# Patient Record
Sex: MALE | Race: OTHER | URBAN - METROPOLITAN AREA
[De-identification: names, ages, dates, MRNs, and addresses within clinical notes are randomized per-mention and may not be internally consistent; named-entity substitution may affect disease eponyms.]

---

## 2017-10-26 ENCOUNTER — OFFICE VISIT (OUTPATIENT)
Dept: INTERNAL MEDICINE CLINIC | Facility: CLINIC | Age: 32
End: 2017-10-26

## 2017-10-26 VITALS
HEART RATE: 66 BPM | TEMPERATURE: 98 F | SYSTOLIC BLOOD PRESSURE: 122 MMHG | WEIGHT: 189 LBS | DIASTOLIC BLOOD PRESSURE: 80 MMHG | HEIGHT: 66 IN | BODY MASS INDEX: 30.37 KG/M2 | OXYGEN SATURATION: 98 %

## 2017-10-26 DIAGNOSIS — J06.9 UPPER RESPIRATORY TRACT INFECTION, UNSPECIFIED TYPE: Primary | ICD-10-CM

## 2017-10-26 PROCEDURE — 99213 OFFICE O/P EST LOW 20 MIN: CPT | Performed by: INTERNAL MEDICINE

## 2017-10-26 PROCEDURE — 99212 OFFICE O/P EST SF 10 MIN: CPT | Performed by: INTERNAL MEDICINE

## 2017-10-26 RX ORDER — IBUPROFEN 200 MG
200 TABLET ORAL EVERY 6 HOURS PRN
COMMUNITY

## 2017-10-26 RX ORDER — AMOXICILLIN AND CLAVULANATE POTASSIUM 875; 125 MG/1; MG/1
1 TABLET, FILM COATED ORAL 2 TIMES DAILY
Qty: 20 TABLET | Refills: 0 | Status: SHIPPED | OUTPATIENT
Start: 2017-10-26 | End: 2017-12-21 | Stop reason: ALTCHOICE

## 2017-10-26 NOTE — PROGRESS NOTES
Patient ID: Eleanor Donahue is a 28year old male.   Patient presents with:  Ear Pain: x4 days  Cough: Reports SOB after coughing       HISTORY OF PRESENT ILLNESS:   HPI  4 day(s) ago  Fever - Yes, Tmax (Unknown, subjective)  Cough - Yes, productive - Y Cans of beer per week         Comment: 10 - 20 drinks every weekend     Drug use: No    Sexual activity: Not on file     Other Topics Concern   None on file     Social History Narrative   None on file           PHYSICAL EXAM:      10/26/17  1132   BP: 122/

## 2017-10-26 NOTE — PATIENT INSTRUCTIONS
1.  Take antibiotics as prescribed. 2.  Symptomatic treatment with hot showers, steams, plenty of fluids, cough suppressants.     Preventing Common Respiratory Infections  Respiratory infections such as colds and influenza (“the flu”) are common in [facility] · Severe dehydration (loss of fluids)  · Sinus problems  · Ear infections   Get a flu vaccine  A flu vaccine protects you from influenza (but not other colds or infections). Get a vaccine each fall, before flu season starts.  This can be done at a clinic, h

## 2017-12-21 ENCOUNTER — OFFICE VISIT (OUTPATIENT)
Dept: INTERNAL MEDICINE CLINIC | Facility: CLINIC | Age: 32
End: 2017-12-21

## 2017-12-21 VITALS
WEIGHT: 196.81 LBS | HEART RATE: 99 BPM | BODY MASS INDEX: 32 KG/M2 | TEMPERATURE: 101 F | DIASTOLIC BLOOD PRESSURE: 79 MMHG | SYSTOLIC BLOOD PRESSURE: 140 MMHG

## 2017-12-21 DIAGNOSIS — R50.9 ACUTE FEBRILE ILLNESS: Primary | ICD-10-CM

## 2017-12-21 PROCEDURE — 99214 OFFICE O/P EST MOD 30 MIN: CPT | Performed by: INTERNAL MEDICINE

## 2017-12-21 PROCEDURE — 99212 OFFICE O/P EST SF 10 MIN: CPT | Performed by: INTERNAL MEDICINE

## 2017-12-21 RX ORDER — OSELTAMIVIR PHOSPHATE 75 MG/1
75 CAPSULE ORAL 2 TIMES DAILY
Qty: 10 CAPSULE | Refills: 0 | Status: SHIPPED | OUTPATIENT
Start: 2017-12-21 | End: 2017-12-26

## 2017-12-21 NOTE — PROGRESS NOTES
HPI:    Patient ID: Amber Diggs. is a 28year old male. Pt is masked  Cough   This is a new problem. Episode onset: 12/18/2017. The problem has been gradually worsening.  Associated symptoms include ear pain (mild), a fever (Ran as high as 102 at [facility] THYROID CA      Smoking status: Former Smoker                                                              Packs/day: 0.10      Years: 10.00        Types: Cigarettes  Smokeless tobacco: Never Used                      Alcohol use: Yes           1.0 oz/week cold symptoms that began 12/18/2017. Pt has a known history of strep sore throat.   - On exam, patient unremarkable  -Temperature on arrival = 101.1 degrees F  -Ordered Respiratory Panel FLU A + B, RSV  -Prescribed Oseltamivir Phosphate (TAMIFLU) 75 MG  -I

## 2017-12-22 ENCOUNTER — TELEPHONE (OUTPATIENT)
Dept: INTERNAL MEDICINE CLINIC | Facility: CLINIC | Age: 32
End: 2017-12-22

## 2017-12-22 ENCOUNTER — TELEPHONE (OUTPATIENT)
Dept: OTHER | Age: 32
End: 2017-12-22

## 2017-12-22 RX ORDER — BENZONATATE 200 MG/1
200 CAPSULE ORAL 3 TIMES DAILY PRN
Qty: 12 CAPSULE | Refills: 0 | Status: SHIPPED | OUTPATIENT
Start: 2017-12-22 | End: 2018-12-21

## 2017-12-22 NOTE — TELEPHONE ENCOUNTER
Signed Prescriptions Disp Refills    benzonatate 200 MG Oral Cap 12 capsule 0      Sig: Take 1 capsule (200 mg total) by mouth 3 (three) times daily as needed for cough.         Authorizing Provider: Tavo kebede to his pharmacy

## 2017-12-22 NOTE — TELEPHONE ENCOUNTER
Alma Delia at [address] [place] [address] Lab calling in a Positive Flue on this patient. Message fwd to  [facility].

## 2017-12-22 NOTE — TELEPHONE ENCOUNTER
Pt called, saw Dr. Fredrick Montague yesterday and was told he had the flu. He  was up all night coughing. He is requesting cough medicine. Please advise.

## 2018-12-21 ENCOUNTER — OFFICE VISIT (OUTPATIENT)
Dept: INTERNAL MEDICINE CLINIC | Facility: CLINIC | Age: 33
End: 2018-12-21
Payer: COMMERCIAL

## 2018-12-21 VITALS
HEIGHT: 66 IN | SYSTOLIC BLOOD PRESSURE: 137 MMHG | HEART RATE: 71 BPM | BODY MASS INDEX: 31.5 KG/M2 | DIASTOLIC BLOOD PRESSURE: 82 MMHG | WEIGHT: 196 LBS | TEMPERATURE: 98 F

## 2018-12-21 DIAGNOSIS — Z71.3 DIETARY COUNSELING: ICD-10-CM

## 2018-12-21 DIAGNOSIS — Z11.3 SCREEN FOR STD (SEXUALLY TRANSMITTED DISEASE): ICD-10-CM

## 2018-12-21 DIAGNOSIS — R03.0 PRE-HYPERTENSION: ICD-10-CM

## 2018-12-21 DIAGNOSIS — Z00.00 ANNUAL PHYSICAL EXAM: Primary | ICD-10-CM

## 2018-12-21 PROCEDURE — 99212 OFFICE O/P EST SF 10 MIN: CPT | Performed by: INTERNAL MEDICINE

## 2018-12-21 PROCEDURE — 99213 OFFICE O/P EST LOW 20 MIN: CPT | Performed by: INTERNAL MEDICINE

## 2018-12-21 NOTE — PATIENT INSTRUCTIONS
Prevention Guidelines, Men Ages 25 to 44  Screening tests and vaccines are an important part of managing your health. A screening test is done to find possible disorders or diseases in people who don't have any symptoms.  The goal is to find a disease ear Vaccines Who needs it How often   Chickenpox (varicella) All men in this age group who have no record of this infection or vaccine 2 doses; the second dose should be given at least 4 weeks after the first dose   Hepatitis A Men at increased risk for infect Sexually transmitted infection prevention Men who are sexually active At routine exams   Skin cancer Prevention of skin cancer in fair-skinned adults through age 25 At routine exams   1Those who are 25years of age, who are not up-to-date on their childhoo Too much weight can make snoring worse. Extra weight puts pressure on your neck tissues and lungs, making breathing harder.  If you're overweight, ask your healthcare provider about a weight-loss program.  Exercise regularly  Exercise can help you lose weig Changing the way you eat can improve your health. It can lower your cholesterol and blood pressure, and help you stay at a healthy weight. Your diet doesn’t have to be bland and boring to be healthy.  Just watch your calories and follow these steps:    Step · Drink more water to match your fiber increase to help prevent constipation. Date Last Reviewed: 6/1/2017  © 3544-3980 [employer]. 140[address] [facility] [place], [address] [place] [address]. All rights reserved.  This information is not intended as a substi Fat is an energy source that can be stored until needed. Fat does not raise blood sugar. However, it can raise blood cholesterol, increasing the risk of heart disease. Fat is also high in calories, which can cause weight gain.  Not all types of fat are the Date Last Reviewed: 3/1/2016  © 2358-8911 [employer]. 1407 [facility] [place], [address] [place]. All rights reserved. This information is not intended as a substitute for professional medical care.  Always follow your healthcare professional' Date Last Reviewed: 6/1/2017  © 5985-1034 [employer]. 1407 [facility] [place], [address] [place]. All rights reserved. This information is not intended as a substitute for professional medical care.  Always follow your healthcare professional'

## 2018-12-21 NOTE — PROGRESS NOTES
HPI:    Patient ID: Giselle Fabian. is a 35year old male. Physical (annual px)      HPI  32y old gentleman, no significant PMH, who presents for annual physical.   Doing well, has complaints of difficulty falling asleep.  Usually sleeps around 9pm, ana Review of Systems:     Constitutional: Negative for activity change, appetite change, chills, fatigue and unexpected weight change. HENT: Negative for congestion, ear discharge, ear pain, sinus pressure, sore throat and tinnitus.     Eyes: Negative for ph Neck: Normal range of motion. Neck supple. Cardiovascular: Normal rate, regular rhythm, normal heart sounds and intact distal pulses. Edema not present. Pulmonary/Chest: Effort normal. No respiratory distress. He has no wheezes. He has no rales.  He Discussed with patient the need to perform moderate to vigorous physical activity of at least 30-45 minutes a day, as tolerated, 3 to 4 times per week.  This should include cardiovascular exercise like running/elliptical as well as some resistance or weight Standing Expiration Date: 12/21/2019      Lipase [E]          Standing Status: Future          Standing Expiration Date: 12/21/2019      HCV Antibody          Standing Status: Future          Standing Expiration Date: 12/21/2019      Hepatitis B Vuong Diabetes mellitus, type 2  All men with prediabetes Every year   Hepatitis C If at increased risk At routine exams   High cholesterol or triglycerides All men ages 28 and older, and younger men at high risk for coronary artery disease At least every 5 year Pneumococca (PCV13) and Pneumococcal (PPSV23) Men at increased risk for infection – talk with your healthcare provider PCV13: 1 dose ages 23 to 72 (protects against 13 types of pneumococcal bacteria)  PPSV23: 1 to 2 doses through age 59, or 1 dose at 72 or Sleeping on your side may keep throat tissue from blocking your air passage. This may improve or even cure snoring. But it can be hard to stop sleeping on your back. Try sewing a pocket or sock onto the back of a T-shirt or pajama top.  Put a few tennis bal If something blocks your nose, treating the problem may help improve snoring. Your healthcare provider can suggest medicines for allergies or sinus problems. Nasal saline rinses can also open up the nasal passages.  Nasal strips applied on the bridge of the · Limit high-salt ingredients, such as soy sauce, bouillon, and garlic salt. · Instead of adding salt when cooking, season your food with herbs and flavorings. Try lemon, garlic, and onion, or salt-free herb seasonings.   · Limit convenience foods, such as Food is a source of fuel and nourishment for your body. It’s also a source of pleasure. Having diabetes doesn’t mean you have to eat special foods or give up desserts. Instead, your dietitian can show you how to plan meals to suit your body.  To start, lear · Polyunsaturated fats are mostly found in vegetable oils, such as corn, safflower, and soybean oils. They are also found in some seeds, nuts, and fish. Polyunsaturated fats lower LDL (unhealthy) cholesterol.  So, choosing them instead of saturated fats is · Get fruity. Stewing apples with just a little bit of sugar and cinnamon can make an excellent side dish for almost any meal.  · Get stocked. Freeze soup and stock to use later. · Get fresh.  Make it a goal to taste at least one new fruit or vegetable a m

## 2020-02-25 ENCOUNTER — OFFICE VISIT (OUTPATIENT)
Dept: INTERNAL MEDICINE CLINIC | Facility: CLINIC | Age: 35
End: 2020-02-25
Payer: COMMERCIAL

## 2020-02-25 VITALS
HEIGHT: 66 IN | SYSTOLIC BLOOD PRESSURE: 128 MMHG | BODY MASS INDEX: 31.02 KG/M2 | TEMPERATURE: 99 F | HEART RATE: 99 BPM | WEIGHT: 193 LBS | DIASTOLIC BLOOD PRESSURE: 79 MMHG

## 2020-02-25 DIAGNOSIS — R68.89 FLU-LIKE SYMPTOMS: Primary | ICD-10-CM

## 2020-02-25 DIAGNOSIS — Z20.828 EXPOSURE TO THE FLU: ICD-10-CM

## 2020-02-25 PROCEDURE — 99214 OFFICE O/P EST MOD 30 MIN: CPT | Performed by: INTERNAL MEDICINE

## 2020-02-25 RX ORDER — CODEINE PHOSPHATE AND GUAIFENESIN 10; 100 MG/5ML; MG/5ML
5 SOLUTION ORAL EVERY 6 HOURS PRN
Qty: 240 ML | Refills: 0 | Status: SHIPPED | OUTPATIENT
Start: 2020-02-25

## 2020-02-25 RX ORDER — OSELTAMIVIR PHOSPHATE 75 MG/1
75 CAPSULE ORAL 2 TIMES DAILY
Qty: 10 CAPSULE | Refills: 0 | Status: SHIPPED | OUTPATIENT
Start: 2020-02-25 | End: 2020-02-25

## 2020-02-25 RX ORDER — OSELTAMIVIR PHOSPHATE 75 MG/1
75 CAPSULE ORAL 2 TIMES DAILY
Qty: 10 CAPSULE | Refills: 0 | Status: SHIPPED | OUTPATIENT
Start: 2020-02-25 | End: 2020-03-01

## 2020-02-25 NOTE — PROGRESS NOTES
History of Present Illness   Patient ID: Fuentes Nicolas. is a 29year old male. Chief Complaint: Body ache and/or chills (c/o coughing, sneezing, dry throat. )      Flu   This is a new problem. The current episode started yesterday.  The problem occurs Eyes: Conjunctivae, EOM and lids are normal. Right eye exhibits no discharge. Left eye exhibits no discharge. Neck: Neck supple. Cardiovascular: Normal rate and normal heart sounds.     Pulmonary/Chest: Effort normal and breath sounds normal. No respira Reviewed Active Problems: There are no active problems to display for this patient.      Reviewed Past Medical History:  Past Medical History:   Diagnosis Date   • History of strep sore throat       Reviewed Family History:  Family History   Problem Relati - guaiFENesin-codeine (CHERATUSSIN AC) 100-10 MG/5ML Oral Solution; Take 5 mL by mouth every 6 (six) hours as needed. Dispense: 240 mL; Refill: 0    2. Exposure to the flu  - Oseltamivir Phosphate 75 MG Oral Cap;  Take 1 capsule (75 mg total) by mouth 2 (t · Acetaminophen or ibuprofen will help ease your fever, muscle aches, and headache. Don’t give aspirin to anyone younger than 25 who has the flu. Aspirin can harm the liver. · Nausea and loss of appetite are common with the flu. Eat light meals.  Drink 6 t © 5545-2054 [employer]. 1407 [facility] [place], [facility] [place]. All rights reserved. This information is not intended as a substitute for professional medical care. Always follow your healthcare professional's instructions.

## 2024-03-25 ENCOUNTER — HOSPITAL ENCOUNTER (OUTPATIENT)
Age: 39
Discharge: HOME | End: 2024-03-25
Payer: COMMERCIAL

## 2024-03-25 ENCOUNTER — APPOINTMENT (OUTPATIENT)
Dept: GENERAL RADIOLOGY | Age: 39
End: 2024-03-25
Attending: PHYSICIAN ASSISTANT
Payer: COMMERCIAL

## 2024-03-25 VITALS
DIASTOLIC BLOOD PRESSURE: 76 MMHG | HEART RATE: 60 BPM | RESPIRATION RATE: 18 BRPM | SYSTOLIC BLOOD PRESSURE: 112 MMHG | TEMPERATURE: 98 F | OXYGEN SATURATION: 99 %

## 2024-03-25 DIAGNOSIS — S92.352A CLOSED FRACTURE OF BASE OF FIFTH METATARSAL BONE OF LEFT FOOT, INITIAL ENCOUNTER: Primary | ICD-10-CM

## 2024-03-25 PROCEDURE — 99214 OFFICE O/P EST MOD 30 MIN: CPT

## 2024-03-25 PROCEDURE — 99204 OFFICE O/P NEW MOD 45 MIN: CPT

## 2024-03-25 PROCEDURE — 73630 X-RAY EXAM OF FOOT: CPT | Performed by: PHYSICIAN ASSISTANT

## 2024-03-25 PROCEDURE — 28470 CLTX METATARSAL FX WO MNP EA: CPT

## 2024-03-25 RX ORDER — IBUPROFEN 600 MG/1
600 TABLET ORAL ONCE
Status: COMPLETED | OUTPATIENT
Start: 2024-03-25 | End: 2024-03-25

## 2024-03-25 NOTE — ED INITIAL ASSESSMENT (HPI)
Pt presents with pain and swelling to left foot. States she slipped and fell last night at home.

## 2024-03-25 NOTE — DISCHARGE INSTRUCTIONS
FOLLOW UP WITH PODIATRY REFERRAL; FOLLOW CAST INSTRUCTIONS AND CONTINUE IBUPROFEN as GUIDED. USE CRUTCHES UNTIL PODIATRY CLEARANCE.

## 2024-03-25 NOTE — ED PROVIDER NOTES
Chief Complaint   Patient presents with    Leg or Foot Injury       HPI:     Chuy Gao Jr. is a 38 year old male who presents for evaluation of left foot injury overnight.  States was walking downstairs and suddenly stepping on a children's toy rolling the left foot.  Notes pain and swelling localized laterally.  Denies previous foot fracture.  Pain aggravated with attempted weightbearing.  Denies numbness or tingling, took Tylenol early a.m. with mild improvement, pain is a 4 out of 10, requesting ibuprofen on arrival.  Denies associated injury including head injury knee or hip pain.      PFSH    PFSH asessment screens reviewed and agree.  Nurses notes reviewed I agree with documentation.    Family History   Problem Relation Age of Onset    Diabetes Father     Hypertension Father     Cancer Maternal Grandmother         BREAST CA    Hypertension Paternal Grandfather     Diabetes Paternal Grandfather     Other (Other[other]) Paternal Grandfather     Cancer Paternal Uncle         ADRENAL GLAND CA    Cancer Cousin         SPINAL CA    Cancer Paternal Aunt         ADRENAL GLAND CA    Cancer Maternal Aunt         THYROID CA     Family history reviewed with patient/caregiver and is not pertinent to presenting problem.  Social History     Socioeconomic History    Marital status:      Spouse name: Not on file    Number of children: Not on file    Years of education: Not on file    Highest education level: Not on file   Occupational History    Not on file   Tobacco Use    Smoking status: Former     Packs/day: 0.10     Years: 10.00     Additional pack years: 0.00     Total pack years: 1.00     Types: Cigarettes     Quit date: 2016     Years since quittin.2    Smokeless tobacco: Never    Tobacco comments:     smokes cigars   Vaping Use    Vaping Use: Never used   Substance and Sexual Activity    Alcohol use: Yes     Alcohol/week: 1.7 standard drinks of alcohol     Types: 2 Cans of beer per week      Comment: 10 - 20 drinks every weekend/ social    Drug use: No    Sexual activity: Not on file   Other Topics Concern    Not on file   Social History Narrative    Not on file     Social Determinants of Health     Financial Resource Strain: Not on file   Food Insecurity: Not on file   Transportation Needs: Not on file   Physical Activity: Not on file   Stress: Not on file   Social Connections: Not on file   Housing Stability: Not on file         ROS:   Positive for stated complaint: Foot pain.  All other systems reviewed and negative except as noted above.  Constitutional and Vital Signs Reviewed.      Physical Exam:     Findings:    /76   Pulse 60   Temp 97.8 °F (36.6 °C) (Temporal)   Resp 18   SpO2 99%   GENERAL: well developed, well nourished, well hydrated, no distress  SKIN: good skin turgor, no obvious rashes  EXTREMITIES: Moderate edema and localized tenderness along the dorsal lateral aspect of the left foot in proximity to the fifth metatarsal.  No crepitus.  No skin tear.  No ankle tenderness.  Normal Silva test.  Normal active range of motion of knee ankle foot.  BUCHANAN without difficulty  HEAD: normocephalic, atraumatic  EYES: sclera non icteric bilateral, conjunctiva clear  NOSE: nasal turbinates: pink, normal mucosa  NEURO: No focal deficits  PSYCH: Alert and oriented x3.  Answering questions appropriately.  Mood appropriate.    MDM/Assessment/Plan:   Orders for this encounter:    Orders Placed This Encounter    XR FOOT, COMPLETE (MIN 3 VIEWS), LEFT (CPT=73630)     Order Specific Question:   What is the Relevant Clinical Indication / Reason for Exam?     Answer:   left foot pain     Order Specific Question:   Release to patient     Answer:   Immediate    Short leg splint    Crutches    ibuprofen (Motrin) tab 600 mg       Labs performed this visit:  No results found for this or any previous visit (from the past 10 hour(s)).    MDM:  Shows a fracture comminuted along the base of the left fifth  metatarsal.  No malalignment.  Patient agrees with short leg splint applied, neurovascular intact.  Ambulated well with crutches nonweightbearing pending D.P.M. referral and follow-up within the week ahead.  Agrees with over-the-counter NSAIDs with precaution with continued use over the days ahead.  Happy with plan of care.  Instructed on indications to go to the emergency department.    Diagnosis:    ICD-10-CM    1. Closed fracture of base of fifth metatarsal bone of left foot, initial encounter  S92.352A           All results reviewed and discussed with patient.  See AVS for detailed discharge instructions for your condition today.    Follow Up with:  Antoine Bain DPM  [facility] [address]  [place] [id number] [phone]    Schedule an appointment as soon as possible for a visit in 1 week  PODIATRY REFERRAL. CALL FOR APPOINTMENT.

## 2024-03-29 ENCOUNTER — OFFICE VISIT (OUTPATIENT)
Dept: PODIATRY CLINIC | Facility: CLINIC | Age: 39
End: 2024-03-29

## 2024-03-29 DIAGNOSIS — S92.352A CLOSED FRACTURE OF BASE OF FIFTH METATARSAL BONE, LEFT, INITIAL ENCOUNTER: Primary | ICD-10-CM

## 2024-03-29 PROCEDURE — 99204 OFFICE O/P NEW MOD 45 MIN: CPT | Performed by: STUDENT IN AN ORGANIZED HEALTH CARE EDUCATION/TRAINING PROGRAM

## 2024-03-29 NOTE — PROGRESS NOTES
[facility] Podiatry  Progress Note      Chuy Gao Jr. is a 38 year old male.   Chief Complaint   Patient presents with    Fracture     Consult - L foot fx  - Pt tripped on a dog toy and fell down the stairs 03/24 - Tiesha t to  on 3/25, was put in a splint. Pain when bending foot.  No numbness or tingling.             HPI:     Patient is a pleasant 38-year-old male who presents to clinic today for evaluation of left fifth metatarsal base fracture which she sustained on March 24 as he tripped over a toy and fell down the stairs.  Patient went to an urgent care center and was placed in a posterior mold and given crutches to be nonweightbearing to left lower extremity.  Patient works as a  and also does refereeing for professional boxing.  He has been taking ibuprofen for pain as needed    Allergies: Patient has no known allergies.    Current Outpatient Medications   Medication Sig Dispense Refill    guaiFENesin-codeine (CHERATUSSIN AC) 100-10 MG/5ML Oral Solution Take 5 mL by mouth every 6 (six) hours as needed. 240 mL 0    ibuprofen 200 MG Oral Tab Take 1 tablet (200 mg total) by mouth every 6 (six) hours as needed for Pain.      acetaminophen (TYLENOL EXTRA STRENGTH) 500 MG Oral Tab Take 1 tablet (500 mg total) by mouth every 6 (six) hours as needed for Pain.        Past Medical History:   Diagnosis Date    History of strep sore throat       Past Surgical History:   Procedure Laterality Date    OTHER SURGICAL HISTORY      REMOVAL OF CYST - CHEST      Family History   Problem Relation Age of Onset    Diabetes Father     Hypertension Father     Cancer Maternal Grandmother         BREAST CA    Hypertension Paternal Grandfather     Diabetes Paternal Grandfather     Other (Other[other]) Paternal Grandfather     Cancer Paternal Uncle         ADRENAL GLAND CA    Cancer Cousin         SPINAL CA    Cancer Paternal Aunt         ADRENAL GLAND CA    Cancer Maternal Aunt         THYROID CA      Social  History     Socioeconomic History    Marital status:    Tobacco Use    Smoking status: Former     Packs/day: 0.10     Years: 10.00     Additional pack years: 0.00     Total pack years: 1.00     Types: Cigarettes     Quit date: 2016     Years since quittin.2    Smokeless tobacco: Never    Tobacco comments:     smokes cigars   Vaping Use    Vaping Use: Never used   Substance and Sexual Activity    Alcohol use: Yes     Alcohol/week: 1.7 standard drinks of alcohol     Types: 2 Cans of beer per week     Comment: 10 - 20 drinks every weekend/ social    Drug use: No           REVIEW OF SYSTEMS:     Denies nause, fever, chills  No calf pain  Denies chest pain or SOB      EXAM:   There were no vitals taken for this visit.  GENERAL: well developed, well nourished, in no apparent distress  EXTREMITIES:   1. Integument: Normal skin temperature and turgor  2. Vascular: Dorsalis pedis two out of four bilateral and posterior tibial pulses two out of   four bilateral, capillary refill normal.   3. Musculoskeletal: All muscle groups are graded 5 out of 5 in the foot and ankle.  Pain with palpation to left fifth metatarsal base   4. Neurological: Normal sharp dull sensation; reflexes normal.      XR FOOT, COMPLETE (MIN 3 VIEWS), LEFT (CPT=73630)    Result Date: 3/25/2024  CONCLUSION:   Acute, transversely oriented fracture involving the base of the 5th metatarsal with intra-articular extension.     Dictated by (CST): Alfred Jauregui MD on 3/25/2024 at 8:58 AM     Finalized by (CST): Alfred Jauregui MD on 3/25/2024 at 9:00 AM            ASSESSMENT AND PLAN:   Diagnoses and all orders for this visit:    Closed fracture of base of fifth metatarsal bone, left, initial encounter        Plan:     Evaluated the patient and discussed treatment options.  Reviewed the patients x-rays with the patient.  Fracture care and treatment plan discussed.  Discussed the importance of immobilization.  Discussed conservative management    Discussed surgical management and indications for both.  Will move forward with conservative  management.  Recommend cryotherapy/icing, rest, and elevation.  Advised patient that he would have to be nonweightbearing to the left lower extremity with crutches or knee scooter for approximately 5 weeks and then weightbearing with Cam boot for another 4 to 5 weeks.  Provided temporary handicap placard application for 3 months  Patient to arrive earlier to his next appointment to have x-rays taken prior to his appointment   Continue taking over-the-counter NSAIDs for pain as needed  Dispensed a cam boot in clinic        The patient indicates understanding of these issues and agrees to the plan.        Miriam Rojas DPM

## 2024-04-26 ENCOUNTER — HOSPITAL ENCOUNTER (OUTPATIENT)
Dept: GENERAL RADIOLOGY | Age: 39
Discharge: HOME | End: 2024-04-26
Attending: STUDENT IN AN ORGANIZED HEALTH CARE EDUCATION/TRAINING PROGRAM
Payer: COMMERCIAL

## 2024-04-26 ENCOUNTER — OFFICE VISIT (OUTPATIENT)
Dept: PODIATRY CLINIC | Facility: CLINIC | Age: 39
End: 2024-04-26

## 2024-04-26 DIAGNOSIS — S92.352A CLOSED FRACTURE OF BASE OF FIFTH METATARSAL BONE, LEFT, INITIAL ENCOUNTER: ICD-10-CM

## 2024-04-26 DIAGNOSIS — S92.352A CLOSED FRACTURE OF BASE OF FIFTH METATARSAL BONE, LEFT, INITIAL ENCOUNTER: Primary | ICD-10-CM

## 2024-04-26 PROCEDURE — 99213 OFFICE O/P EST LOW 20 MIN: CPT | Performed by: STUDENT IN AN ORGANIZED HEALTH CARE EDUCATION/TRAINING PROGRAM

## 2024-04-26 PROCEDURE — 73630 X-RAY EXAM OF FOOT: CPT | Performed by: STUDENT IN AN ORGANIZED HEALTH CARE EDUCATION/TRAINING PROGRAM

## 2024-04-26 NOTE — PROGRESS NOTES
[facility] Podiatry  Progress Note      Chuy Gao Jr. is a 38 year old male.   Chief Complaint   Patient presents with    Fracture     F/u left fifth metatarsa fracture. On and off pressure pain 5/10 without boot and does ROM.             HPI:     Patient is a pleasant 38-year-old male who presents to clinic for follow-up of left fifth metatarsal fracture.  He has been ambulating in the cam boot as instructed.  Admits to pain at the site off-and-on rating it a 5 out of 10    Allergies: Patient has no known allergies.    Current Outpatient Medications   Medication Sig Dispense Refill    guaiFENesin-codeine (CHERATUSSIN AC) 100-10 MG/5ML Oral Solution Take 5 mL by mouth every 6 (six) hours as needed. 240 mL 0    ibuprofen 200 MG Oral Tab Take 1 tablet (200 mg total) by mouth every 6 (six) hours as needed for Pain.      acetaminophen (TYLENOL EXTRA STRENGTH) 500 MG Oral Tab Take 1 tablet (500 mg total) by mouth every 6 (six) hours as needed for Pain.        Past Medical History:    History of strep sore throat      Past Surgical History:   Procedure Laterality Date    Other surgical history      REMOVAL OF CYST - CHEST      Family History   Problem Relation Age of Onset    Diabetes Father     Hypertension Father     Cancer Maternal Grandmother         BREAST CA    Hypertension Paternal Grandfather     Diabetes Paternal Grandfather     Other (Other[other]) Paternal Grandfather     Cancer Paternal Uncle         ADRENAL GLAND CA    Cancer Cousin         SPINAL CA    Cancer Paternal Aunt         ADRENAL GLAND CA    Cancer Maternal Aunt         THYROID CA      Social History     Socioeconomic History    Marital status:    Tobacco Use    Smoking status: Former     Current packs/day: 0.00     Average packs/day: 0.1 packs/day for 10.0 years (1.0 ttl pk-yrs)     Types: Cigarettes     Start date: 2006     Quit date: 2016     Years since quittin.3    Smokeless tobacco: Never    Tobacco comments:      smokes cigars   Vaping Use    Vaping status: Never Used   Substance and Sexual Activity    Alcohol use: Yes     Alcohol/week: 1.7 standard drinks of alcohol     Types: 2 Cans of beer per week     Comment: 10 - 20 drinks every weekend/ social    Drug use: No           REVIEW OF SYSTEMS:     Denies nause, fever, chills  No calf pain  Denies chest pain or SOB      EXAM:   There were no vitals taken for this visit.  GENERAL: well developed, well nourished, in no apparent distress  EXTREMITIES:   1. Integument: Normal skin temperature and turgor  2. Vascular: Dorsalis pedis two out of four bilateral and posterior tibial pulses two out of   four bilateral, capillary refill normal.   3. Musculoskeletal: All muscle groups are graded 5 out of 5 in the foot and ankle.  Pain with palpation at the base of the left fifth metatarsal   4. Neurological: Normal sharp dull sensation; reflexes normal.             ASSESSMENT AND PLAN:   Diagnoses and all orders for this visit:    Closed fracture of base of fifth metatarsal bone, left, initial encounter  -     XR FOOT WEIGHTBEARING (3 VIEWS), LEFT (CPT=73630); Future        Plan:     Patient seen and examined and findings discussed with patient.  Reviewed left foot x-rays with patient.  There is healing noted at the fracture site however fracture line is still very visible.  Advised patient that he needs to remain weightbearing in the cam boot for additional 4 weeks.  He needs to arrive early on his next appointment to have x-rays done before he is seen.    The patient indicates understanding of these issues and agrees to the plan.        Miriam Rojas DPM

## 2024-05-17 ENCOUNTER — HOSPITAL ENCOUNTER (OUTPATIENT)
Dept: GENERAL RADIOLOGY | Age: 39
Discharge: HOME | End: 2024-05-17
Attending: STUDENT IN AN ORGANIZED HEALTH CARE EDUCATION/TRAINING PROGRAM

## 2024-05-17 ENCOUNTER — OFFICE VISIT (OUTPATIENT)
Dept: PODIATRY CLINIC | Facility: CLINIC | Age: 39
End: 2024-05-17

## 2024-05-17 DIAGNOSIS — S92.352A CLOSED FRACTURE OF BASE OF FIFTH METATARSAL BONE, LEFT, INITIAL ENCOUNTER: ICD-10-CM

## 2024-05-17 DIAGNOSIS — S99.192D CLOSED FRACTURE OF BASE OF FIFTH METATARSAL BONE OF LEFT FOOT AT METAPHYSEAL-DIAPHYSEAL JUNCTION WITH ROUTINE HEALING, SUBSEQUENT ENCOUNTER: Primary | ICD-10-CM

## 2024-05-17 PROCEDURE — 99213 OFFICE O/P EST LOW 20 MIN: CPT | Performed by: STUDENT IN AN ORGANIZED HEALTH CARE EDUCATION/TRAINING PROGRAM

## 2024-05-17 PROCEDURE — 73630 X-RAY EXAM OF FOOT: CPT | Performed by: STUDENT IN AN ORGANIZED HEALTH CARE EDUCATION/TRAINING PROGRAM

## 2024-05-17 NOTE — PROGRESS NOTES
[facility] Podiatry  Progress Note      Chuy Gao Jr. is a 38 year old male.   Chief Complaint   Patient presents with    Fracture     L foot fx f/u -  Pt states he  is healing well.  Some tenderness in the AM. No numbness or tingling             HPI:     Patient is a 38-year-old male presents to clinic for follow-up of left fifth metatarsal base fracture.  Denies any pain.  Patient would like to know if he can begin participating in [employer].  Patient has purchased a left ankle Velcro brace.    Allergies: Patient has no known allergies.    Current Outpatient Medications   Medication Sig Dispense Refill    guaiFENesin-codeine (CHERATUSSIN AC) 100-10 MG/5ML Oral Solution Take 5 mL by mouth every 6 (six) hours as needed. 240 mL 0    ibuprofen 200 MG Oral Tab Take 1 tablet (200 mg total) by mouth every 6 (six) hours as needed for Pain.      acetaminophen (TYLENOL EXTRA STRENGTH) 500 MG Oral Tab Take 1 tablet (500 mg total) by mouth every 6 (six) hours as needed for Pain.        Past Medical History:    History of strep sore throat      Past Surgical History:   Procedure Laterality Date    Other surgical history      REMOVAL OF CYST - CHEST      Family History   Problem Relation Age of Onset    Diabetes Father     Hypertension Father     Cancer Maternal Grandmother         BREAST CA    Hypertension Paternal Grandfather     Diabetes Paternal Grandfather     Other (Other[other]) Paternal Grandfather     Cancer Paternal Uncle         ADRENAL GLAND CA    Cancer Cousin         SPINAL CA    Cancer Paternal Aunt         ADRENAL GLAND CA    Cancer Maternal Aunt         THYROID CA      Social History     Socioeconomic History    Marital status:    Tobacco Use    Smoking status: Former     Current packs/day: 0.00     Average packs/day: 0.1 packs/day for 10.0 years (1.0 ttl pk-yrs)     Types: Cigarettes     Start date: 2006     Quit date: 2016     Years since quittin.4    Smokeless tobacco: Never     Tobacco comments:     smokes cigars   Vaping Use    Vaping status: Never Used   Substance and Sexual Activity    Alcohol use: Yes     Alcohol/week: 1.7 standard drinks of alcohol     Types: 2 Cans of beer per week     Comment: 10 - 20 drinks every weekend/ social    Drug use: No           REVIEW OF SYSTEMS:     Denies nause, fever, chills  No calf pain  Denies chest pain or SOB      EXAM:   There were no vitals taken for this visit.  GENERAL: well developed, well nourished, in no apparent distress  EXTREMITIES:   1. Integument: Normal skin temperature and turgor  2. Vascular: Dorsalis pedis two out of four bilateral and posterior tibial pulses two out of   four bilateral, capillary refill normal.   3. Musculoskeletal: All muscle groups are graded 5 out of 5 in the foot and ankle.  No pain with palpation to base of left fifth metatarsal   4. Neurological: Normal sharp dull sensation; reflexes normal.             ASSESSMENT AND PLAN:   Diagnoses and all orders for this visit:    Closed fracture of base of fifth metatarsal bone of left foot at metaphyseal-diaphyseal junction with routine healing, subsequent encounter        Plan:   Patient seen and examined and findings discussed with  Patient.  Advised patient that he may transition out of the cam boot into supportive shoes.  Avoid walking barefoot.  Avoid running or jumping.  Avoid any high impact activities to lower extremity.  Return to clinic in 8 weeks for repeat x-rays.    The patient indicates understanding of these issues and agrees to the plan.        Miriam Rojas DPM

## 2024-08-23 ENCOUNTER — APPOINTMENT (OUTPATIENT)
Dept: GENERAL RADIOLOGY | Age: 39
End: 2024-08-23
Attending: NURSE PRACTITIONER
Payer: COMMERCIAL

## 2024-08-23 ENCOUNTER — HOSPITAL ENCOUNTER (OUTPATIENT)
Age: 39
Discharge: HOME | End: 2024-08-23
Payer: COMMERCIAL

## 2024-08-23 VITALS
RESPIRATION RATE: 16 BRPM | HEART RATE: 58 BPM | SYSTOLIC BLOOD PRESSURE: 148 MMHG | OXYGEN SATURATION: 99 % | DIASTOLIC BLOOD PRESSURE: 90 MMHG | TEMPERATURE: 98 F

## 2024-08-23 DIAGNOSIS — S62.609A MULTIPLE CLOSED FRACTURES OF FINGER, INITIAL ENCOUNTER: Primary | ICD-10-CM

## 2024-08-23 PROCEDURE — 99213 OFFICE O/P EST LOW 20 MIN: CPT

## 2024-08-23 PROCEDURE — 73130 X-RAY EXAM OF HAND: CPT | Performed by: NURSE PRACTITIONER

## 2024-08-23 PROCEDURE — 26750 TREAT FINGER FRACTURE EACH: CPT

## 2024-08-23 PROCEDURE — 90471 IMMUNIZATION ADMIN: CPT

## 2024-08-23 PROCEDURE — 99214 OFFICE O/P EST MOD 30 MIN: CPT

## 2024-08-23 RX ORDER — CEPHALEXIN 500 MG/1
500 CAPSULE ORAL 4 TIMES DAILY
Qty: 28 CAPSULE | Refills: 0 | Status: SHIPPED | OUTPATIENT
Start: 2024-08-23 | End: 2024-08-30

## 2024-08-23 RX ORDER — IBUPROFEN 600 MG/1
600 TABLET, FILM COATED ORAL ONCE
Status: COMPLETED | OUTPATIENT
Start: 2024-08-23 | End: 2024-08-23

## 2024-08-23 NOTE — DISCHARGE INSTRUCTIONS
Ice and elevate the digits.  Tylenol or ibuprofen as needed for pain.  Make a close follow-up appointment with the hand specialist.  Take the antibiotics as prescribed to avoid infection.  Return for any concerns.

## 2024-08-23 NOTE — ED PROVIDER NOTES
He    Patient Seen in: [facility]      History     Chief Complaint   Patient presents with    Finger Injury     Stated Complaint: hand injury  Subjective:   39-year-old healthy male presents for a right hand injury.  He states he was getting a delivery and the deliveryman dropped it a 1200 pound package of wearing that fell onto the palmar aspects of the distal right second third and fourth digits.  There is bruising and swelling present.  The patient is able to flex and extend all digits, but has discomfort when doing so.  There is some bleeding from under the nailbed, but no open wounds on the palmar aspects of the digits.  Partial subungual hematomas are present.  No numbness or tingling.  He is right-hand dominant.  No history of a fracture to this hand in the past.  He is unsure of his last tetanus vaccine.  He appears nontoxic.      Objective:   Past Medical History:    History of strep sore throat            Past Surgical History:   Procedure Laterality Date    Other surgical history      REMOVAL OF CYST - CHEST              Social History     Socioeconomic History    Marital status:    Tobacco Use    Smoking status: Former     Current packs/day: 0.00     Average packs/day: 0.1 packs/day for 10.0 years (1.0 ttl pk-yrs)     Types: Cigarettes     Start date: 2006     Quit date: 2016     Years since quittin.6    Smokeless tobacco: Never    Tobacco comments:     smokes cigars   Vaping Use    Vaping status: Never Used   Substance and Sexual Activity    Alcohol use: Yes     Alcohol/week: 1.7 standard drinks of alcohol     Types: 2 Cans of beer per week     Comment: 10 - 20 drinks every weekend/ social    Drug use: No     Social Determinants of Health     Physical Activity: Low Risk  (8/10/2023)    [facility]    Exercise Vital Sign     On average, how many days per week do you engage in moderate to strenuous exercise (like a brisk  walk)?: 5 days     On average, how many minutes do you engage in exercise at this level?: 30 min            Review of Systems    Positive for stated complaint: Finger Injury     Other systems are as noted in HPI.  Constitutional and vital signs reviewed.      All other systems reviewed and negative except as noted above.    Physical Exam     ED Triage Vitals [08/23/24 1017]   /90   Pulse 58   Resp 16   Temp 98 °F (36.7 °C)   Temp src Temporal   SpO2 99 %   O2 Device None (Room air)     Current:/90   Pulse 58   Temp 98 °F (36.7 °C) (Temporal)   Resp 16   SpO2 99%     Physical Exam  Vitals and nursing note reviewed.   Constitutional:       General: He is not in acute distress.     Appearance: Normal appearance. He is not toxic-appearing.   Cardiovascular:      Rate and Rhythm: Normal rate and regular rhythm.   Pulmonary:      Effort: Pulmonary effort is normal.      Breath sounds: Normal breath sounds.   Musculoskeletal:         General: Swelling, tenderness and signs of injury present. No deformity.      Cervical back: Normal range of motion.      Comments: Pain with bruising and swelling to the distal aspects of the right second, third, and fourth digits.  There are partial subungual hematomas present.  There is some bleeding from under the nail beds that is dried.  No active bleeding.  Bruising and swelling to the pads of the digits.  He is able to flex and extend the digits against resistance with discomfort.  No deformities.   Skin:     Capillary Refill: Capillary refill takes less than 2 seconds.      Findings: Bruising present. No erythema.      Comments: No signs of infection.   Neurological:      General: No focal deficit present.      Mental Status: He is alert and oriented to person, place, and time.      Comments: Brisk cap refill all digits of the right hand.   Psychiatric:         Mood and Affect: Mood normal.         Behavior: Behavior normal.         ED Course   No results found.  Labs  Reviewed - No data to display    MDM     Medical Decision Making  The patient's tetanus was updated.  The x-ray results are below.  Wound care was done and finger splints were applied to the left third and fourth digits.  We discussed supportive care including ice, elevation, and ibuprofen as needed for pain.  Ibuprofen was given here.  Patient will follow-up with the hand specialist.  Due to the bleeding from under the fingernails and possible open skin in that area, I will place him on a course of Keflex due to possible open fracture.    Amount and/or Complexity of Data Reviewed  Radiology: ordered.     Details: I visualized the x-ray images, the results are below.    There is nondisplaced incomplete vertical fracture through the tuft of the 3rd and 4th digit      There is mild periosteal new bone formation around the tuft of the 3rd digit       Risk  OTC drugs.  Prescription drug management.  Risk Details: Fracture versus contusion        Disposition and Plan     Clinical Impression:  1. Multiple closed fractures of finger, initial encounter         Disposition:  Discharge  8/23/2024 11:30 am    Follow-up:  Donald Wayne MD  [address] [place] [address] [id number] [phone]    Call   to arrange follow up          Medications Prescribed:  Discharge Medication List as of 8/23/2024 11:33 AM        START taking these medications    Details   cephalexin 500 MG Oral Cap Take 1 capsule (500 mg total) by mouth 4 (four) times daily for 7 days., Normal, Disp-28 capsule, R-0

## 2024-08-23 NOTE — ED INITIAL ASSESSMENT (HPI)
Patient with finger injury to right hand   He is an , states that 1200 pound rail pincked his three fingers on right hand involving pointer, middle and ring finger tips   Nails with bruising   Patient iced the area

## 2024-08-26 ENCOUNTER — OFFICE VISIT (OUTPATIENT)
Dept: SURGERY | Facility: CLINIC | Age: 39
End: 2024-08-26

## 2024-08-26 ENCOUNTER — APPOINTMENT (OUTPATIENT)
Dept: SURGERY | Facility: CLINIC | Age: 39
End: 2024-08-26

## 2024-08-26 DIAGNOSIS — S62.662A CLOSED NONDISPLACED FRACTURE OF DISTAL PHALANX OF RIGHT MIDDLE FINGER, INITIAL ENCOUNTER: Primary | ICD-10-CM

## 2024-08-26 DIAGNOSIS — S62.664A CLOSED NONDISPLACED FRACTURE OF DISTAL PHALANX OF RIGHT RING FINGER, INITIAL ENCOUNTER: ICD-10-CM

## 2024-08-26 PROCEDURE — 29130 APPL FINGER SPLINT STATIC: CPT | Performed by: OCCUPATIONAL THERAPIST

## 2024-08-26 PROCEDURE — 99204 OFFICE O/P NEW MOD 45 MIN: CPT | Performed by: PLASTIC SURGERY

## 2024-08-26 NOTE — H&P
Chuy Gao Jr. is a 39 year old male that presents with   Chief Complaint   Patient presents with    Fracture     RRF, RMF    .    REFERRED BY:  Leroy Parson    Pacemaker: No  Latex Allergy: no  Coumadin: No  Plavix: No  Other anticoagulants: No  Diet medication: No  Cardiac stents: No    HAND DOMINANCE:  Right    Profession: [employer]      RECONSTRUCTIVE HISTORY    SUN EXPOSURE   Current yes   Past yes   Sunburns no   Tanning salons current no   Tanning salons past yes     SKIN CANCER    Personal history of skin cancer: none      HPI:       Injury 1: RMF/RRF, nondisplaced tuft fractures  - Date: 08/23/24  - Days Since: 3      39-year-old male right-hand-dominant with RMF/RRF fractures    A package fell onto his hand    Immediate care that day, x-rayed and splinted  No pain                Review of Systems:   Constitutional: No change in appetite, chill/rigors, or fatigue  GI: No jaundice  Endocrine: No generalized weakness  Neurological: No aphasia, loss of consciousness, or seizures    Musculoskeletal:      Date of injury 8/23/24     Location right      middle finger  ring finger   RMF subungual hematoma   Mechanism Package was being delivered and fell onto his hand      Treatment Seen in immediate care on 8/23/24, x-ray performed, splinted  Started on oral antibiotics      Pain  no     Numbness None      PMH:     MEDICAL  Past Medical History:    History of strep sore throat        SURGICAL  Past Surgical History:   Procedure Laterality Date    Other surgical history      REMOVAL OF CYST - CHEST        ALLERIGIES  No Known Allergies     MEDICATIONS  Current Outpatient Medications   Medication Sig Dispense Refill    cephalexin 500 MG Oral Cap Take 1 capsule (500 mg total) by mouth 4 (four) times daily for 7 days. 28 capsule 0        SOCIAL HISTORY  Social History     Socioeconomic History    Marital status:    Tobacco Use    Smoking status: Former     Current packs/day: 0.00     Average  packs/day: 0.1 packs/day for 10.0 years (1.0 ttl pk-yrs)     Types: Cigarettes     Start date: 2006     Quit date: 2016     Years since quittin.6    Smokeless tobacco: Never    Tobacco comments:     smokes cigars   Vaping Use    Vaping status: Never Used   Substance and Sexual Activity    Alcohol use: Yes     Alcohol/week: 1.7 standard drinks of alcohol     Types: 2 Cans of beer per week     Comment: 10 - 20 drinks every weekend/ social    Drug use: No   Other Topics Concern    Right Handed Yes     Social Determinants of Health     Physical Activity: Low Risk  (8/10/2023)    [facility]    Exercise Vital Sign     On average, how many days per week do you engage in moderate to strenuous exercise (like a brisk walk)?: 5 days     On average, how many minutes do you engage in exercise at this level?: 30 min        FAMILY HISTORY  Family History   Problem Relation Age of Onset    Diabetes Father     Hypertension Father     Cancer Maternal Grandmother         BREAST CA    Hypertension Paternal Grandfather     Diabetes Paternal Grandfather     Other (Other[other]) Paternal Grandfather     Cancer Paternal Uncle         ADRENAL GLAND CA    Cancer Cousin         SPINAL CA    Cancer Paternal Aunt         ADRENAL GLAND CA    Cancer Maternal Aunt         THYROID CA          PHYSICAL EXAM:     CONSTITUTIONAL: Overall appearance - Normal  HEENT: Normocephalic  EYES: Conjunctiva - Right: Normal, Left: Normal; EOMI  EARS: Inspection - Right: Normal, Left: Normal  NECK/THYROID: Inspection - Normal, Palpation - Normal, Thyroid gland - Normal, No adenopathy  RESPIRATORY: Inspection - Normal, Effort - Normal  CARDIOVASCULAR: Regular rhythm, No murmurs  ABDOMEN: Inspection - Normal, No abdominal tenderness  NEURO: Memory intact  PSYCH: Oriented to person, place, time, and situation, Appropriate mood and affect      Hand Physical Exam:     RMF nontender nontense subungual  hematoma  No lag  Mild tenderness RMF/RRF tips    X-ray independently interpreted: Nondisplaced tuft fractures RRF/RRF    ASSESSMENT/PLAN:     Fracture: LUIS FELIPE/RRF tuft fractures, nondisplaced    We discussed the fracture/dislocation and the treatment options.  Questions were answered and the patient wishes to proceed with treatment.     SPLINT - This fracture can be treated with a splint.  We discussed splint care and instructions, as well as restrictions.  If there is displacement of the fracture, surgery may be required.    Finger extension splints for 2 weeks    We discussed restrictions.    Even with satisfactory healing, the hand/digit may not regain normal ROM or normal function.      Finger extension splints  In 2 weeks start active and passive range of motion, strengthening, resistance  3 weeks back to work regular duty, 9/16 8/26/2024  Donald Maharaj MD      +++++++++++++++++++++++++++++++++++++++++      MEDICAL DECISION MAKING    PROBLEMS      MODERATE    (number / complexity)          Acute complicated injury    DATA         MODERATE    (amount / complexity)          X-rays independently reviewed    MANAGEMENT RISK  LOW    (complications/ morbidity)       Splint/OT                  MDM LEVEL    MODERATE

## 2024-09-10 ENCOUNTER — APPOINTMENT (OUTPATIENT)
Dept: SURGERY | Facility: CLINIC | Age: 39
End: 2024-09-10

## 2024-09-10 DIAGNOSIS — M62.81 DISTAL MUSCLE WEAKNESS: Primary | ICD-10-CM

## 2024-09-10 DIAGNOSIS — M25.641 JOINT STIFFNESS OF HAND, RIGHT: ICD-10-CM

## 2024-09-10 PROCEDURE — 97110 THERAPEUTIC EXERCISES: CPT | Performed by: OCCUPATIONAL THERAPIST

## 2024-09-10 PROCEDURE — 97165 OT EVAL LOW COMPLEX 30 MIN: CPT | Performed by: OCCUPATIONAL THERAPIST

## 2024-09-11 NOTE — PROGRESS NOTES
OCCUPATIONAL THERAPY EVALUATION:   Chuy Gao JrToo   [id number]       SUBJECTIVE:    HX of Injury: RMF, RRF non-displaced Tuft Fractures  Chief Complaint:  Patient stated that he never stopped working..    Precautions:  2 # kifting restriction with the right hand until 09/16/2024.  Premorbid Functional Status: Independent w/ Occ. duties, Independent w/ driving / sitting, Independent w/ ADL's  Current Level of Function: 2 # lifting restriction with the right hand.  Employment: Working restricted duty  Hand Dominance: right  Living Situation: Family  Barriers to Learning: None  Patient Goals: Full use of the right hand.    Imaging/Tests: X-ray        OBJECTIVE DATA:   PAIN:   Rating (1/10): 1/10 at rest, 2/10 with activity  Location:       ORTHOTICS:    RMF, RRF extension splints.  ,    SENSORY:  Intact        AROM/PROM:  (Degrees)  RIGHT HAND:    Thumb IF MF RF SF   MP   80 80    PIP   80 80    DIP   40 40    BARNETT   200 BARNETT 200 BARNETT              STRENGTH: (lbs) Right Average Left Average   : 100 # 90 #   2 pt Pinch:     3 pt Pinch:     Lateral Pinch:         ASSESSMENT & PLAN OF CARE:    Treatment Provided: Patient was seen for an initial evaluation, hand washing   HEP:  AROM, Tendon glides, x 20 reps per set, x 5 sets daily. Reviewed hand elevation importance. Written handout was provided to reinforce today's treatment and educational session.       Rehabilitation Potential: Excellent    CLINICAL ASSESSMENT:    Patient/Caregiver Education Provided: Yes    Treatment Plan:  Therapeutic Exercise  Therapeutic Activities  Patient/Family Education  Splinting: RRF, RMF extension splints    GOALS:  Short term goals to be reached in x 1 session:    1) Independent with HEP..    Long term goals to be reached by 09/16/2024:    1) Full functional use of the involved extremity for self-care, leisure and work related tasks:.        Patient will be seen 1 x /week for 3 weeks or a total of 3 visits.   Pt. was advised regarding  the findings of this evaluation and agrees to the plan of care.     Juice ANAYA, OTRL

## 2025-06-26 ENCOUNTER — HOSPITAL ENCOUNTER (OUTPATIENT)
Age: 40
Discharge: HOME | End: 2025-06-26
Payer: COMMERCIAL

## 2025-06-26 VITALS
TEMPERATURE: 98 F | HEART RATE: 101 BPM | RESPIRATION RATE: 20 BRPM | DIASTOLIC BLOOD PRESSURE: 73 MMHG | OXYGEN SATURATION: 99 % | SYSTOLIC BLOOD PRESSURE: 130 MMHG

## 2025-06-26 DIAGNOSIS — L03.114 CELLULITIS OF LEFT UPPER EXTREMITY: ICD-10-CM

## 2025-06-26 DIAGNOSIS — L02.91 ABSCESS: Primary | ICD-10-CM

## 2025-06-26 PROCEDURE — 99214 OFFICE O/P EST MOD 30 MIN: CPT

## 2025-06-26 PROCEDURE — 10121 INC&RMVL FB SUBQ TISS COMP: CPT

## 2025-06-26 PROCEDURE — 99213 OFFICE O/P EST LOW 20 MIN: CPT

## 2025-06-26 PROCEDURE — 10061 I&D ABSCESS COMP/MULTIPLE: CPT

## 2025-06-26 RX ORDER — SULFAMETHOXAZOLE AND TRIMETHOPRIM 800; 160 MG/1; MG/1
1 TABLET ORAL 2 TIMES DAILY
Qty: 14 TABLET | Refills: 0 | Status: SHIPPED | OUTPATIENT
Start: 2025-06-26 | End: 2025-07-03

## 2025-06-26 RX ORDER — LIDOCAINE HYDROCHLORIDE 10 MG/ML
5 INJECTION, SOLUTION EPIDURAL; INFILTRATION; INTRACAUDAL; PERINEURAL ONCE
Status: COMPLETED | OUTPATIENT
Start: 2025-06-26 | End: 2025-06-26

## 2025-06-26 NOTE — ED INITIAL ASSESSMENT (HPI)
Patent reports bump to left deltoid for years.  Reports discharge from the site a few days ago. States discharge yellow/white in color.  Now with increased redness, warmth and tenderness to the site.  Denies fevers, chills.

## 2025-06-27 NOTE — ED PROVIDER NOTES
Patient Seen in: [facility]       The following individual(s) verbally consented to be recorded using ambient AI listening technology and understand that they can each withdraw their consent to this listening technology at any point by asking the clinician to turn off or pause the recording:    Patient name: Chuy Gao Jr.        History  Chief Complaint   Patient presents with    Abscess     Stated Complaint: abscess on left arm    Subjective:   HPI     Chuy Gao Jr. is a 39 year old male who presents with a swollen, painful arm cyst.    The cyst on his arm was accidentally bumped against a wall, leading to pus drainage. He attempted to squeeze out the pus, resulting in increased tenderness and bruising. The area is swollen, hot to the touch, and feels like a 'mean charley horse'. The last drainage occurred two days ago, but swelling and heat persist. He has been using a 'temple popping kit', which may have contributed to the tenderness and bruising. No fever is present, and symptoms are isolated to the affected area. He is not aware of any medication allergies.        Objective:     Past Medical History:    History of strep sore throat              Past Surgical History:   Procedure Laterality Date    Other surgical history      REMOVAL OF CYST - CHEST                Social History     Socioeconomic History    Marital status:    Tobacco Use    Smoking status: Former     Current packs/day: 0.00     Average packs/day: 0.1 packs/day for 10.0 years (1.0 ttl pk-yrs)     Types: Cigarettes     Start date: 2006     Quit date: 2016     Years since quittin.5    Smokeless tobacco: Never    Tobacco comments:     smokes cigars   Vaping Use    Vaping status: Never Used   Substance and Sexual Activity    Alcohol use: Yes     Alcohol/week: 1.7 standard drinks of alcohol     Types: 2 Cans of beer per week     Comment: 10 - 20 drinks every weekend/ social    Drug use: No   Other Topics  Concern    Right Handed Yes              Review of Systems    Positive for stated complaint: abscess on left arm  Other systems are as noted in HPI.  Constitutional and vital signs reviewed.      All other systems reviewed and negative except as noted above.                  Physical Exam    ED Triage Vitals [06/26/25 1849]   /73   Pulse 101   Resp 20   Temp 98.3 °F (36.8 °C)   Temp src Oral   SpO2 99 %   O2 Device None (Room air)       Current Vitals:   Vital Signs  BP: 130/73  Pulse: 101  Resp: 20  Temp: 98.3 °F (36.8 °C)  Temp src: Oral    Oxygen Therapy  SpO2: 99 %  O2 Device: None (Room air)            Physical Exam  Vitals and nursing note reviewed.   HENT:      Head: Normocephalic.      Right Ear: Tympanic membrane normal.      Left Ear: Tympanic membrane normal.      Nose: Nose normal.      Mouth/Throat:      Mouth: Mucous membranes are moist.   Eyes:      Pupils: Pupils are equal, round, and reactive to light.   Cardiovascular:      Rate and Rhythm: Normal rate and regular rhythm.   Pulmonary:      Effort: Pulmonary effort is normal. No respiratory distress.      Breath sounds: No wheezing.   Musculoskeletal:         General: Normal range of motion.      Cervical back: Normal range of motion.   Skin:     General: Skin is warm and dry.          Neurological:      Mental Status: He is alert.                 ED Course  Labs Reviewed - No data to display                    MDM            Medical Decision Making  Assessment and Plan  Abscess of arm  Abscess likely from trauma and infection, no systemic symptoms.  - Perform incision and drainage.  - Administer local anesthesia.  - Prescribe antibiotics.    Procedure Note:  Risks and benefits of procedure discussed.   Area prepped and draped.  1%lidocaine with epi 5 cc injected circumferentially.  3cm incision to abscess site made with #11 blade, all loculations broken.  5cc yellow pus drained.    Dressing applied.  Pt tolerated procedure well.  Wound  probed, no tracking internally, no packing placed.     Physical exam remained stable over serial reexaminations as previously documented. External chart review completed. No recent hospitalizations for the same.      I have discussed with the patient the results of tests, differential diagnosis, and warning signs and symptoms that should prompt immediate return.  The patient understands these instructions and agrees to the follow-up plan provided.  There are no barriers to learning.   Appropriate f/u given.  Patient agrees to return for any concerns/problems/complications.    Differential diagnosis reflecting the complexity of care include: abscess, cellulitis, incision and drainage procedure    Comorbidities that add complexity to management include:na    External chart review was done and was noted:Yes    History obtained by an independent source was from: Patient    Discussions of management was done with:Patient    My independent interpretation of studies of:na    Diagnostic tests and medications considered but not ordered were:na    Social determinants of health that affect care:NA    Shared decision making was done by Self, Patient          Disposition and Plan     Clinical Impression:  1. Abscess    2. Cellulitis of left upper extremity         Disposition:  Discharge  6/26/2025  7:33 pm    Follow-up:  Torsten Martinez MD  [address] [place] [id number]  [phone]                Medications Prescribed:  Current Discharge Medication List        START taking these medications    Details   sulfamethoxazole-trimethoprim -160 MG Oral Tab per tablet Take 1 tablet by mouth 2 (two) times daily for 7 days.  Qty: 14 tablet, Refills: 0                   Supplementary Documentation:

## 2025-08-07 ENCOUNTER — HOSPITAL ENCOUNTER (OUTPATIENT)
Dept: GENERAL RADIOLOGY | Age: 40
Discharge: HOME | End: 2025-08-07
Attending: PHYSICAL MEDICINE & REHABILITATION

## 2025-08-07 ENCOUNTER — OFFICE VISIT (OUTPATIENT)
Dept: PHYSICAL MEDICINE AND REHAB | Facility: CLINIC | Age: 40
End: 2025-08-07

## 2025-08-07 VITALS — HEIGHT: 66 IN | BODY MASS INDEX: 29.89 KG/M2 | WEIGHT: 186 LBS

## 2025-08-07 DIAGNOSIS — M51.26 HNP (HERNIATED NUCLEUS PULPOSUS), LUMBAR: Primary | ICD-10-CM

## 2025-08-07 DIAGNOSIS — M51.26 HNP (HERNIATED NUCLEUS PULPOSUS), LUMBAR: ICD-10-CM

## 2025-08-07 PROCEDURE — 99204 OFFICE O/P NEW MOD 45 MIN: CPT | Performed by: PHYSICAL MEDICINE & REHABILITATION

## 2025-08-07 PROCEDURE — 3008F BODY MASS INDEX DOCD: CPT | Performed by: PHYSICAL MEDICINE & REHABILITATION

## 2025-08-07 PROCEDURE — 72114 X-RAY EXAM L-S SPINE BENDING: CPT | Performed by: PHYSICAL MEDICINE & REHABILITATION

## 2025-08-07 RX ORDER — METHYLPREDNISOLONE 4 MG/1
TABLET ORAL
Qty: 1 EACH | Refills: 0 | Status: SHIPPED | OUTPATIENT
Start: 2025-08-07

## 2025-08-07 RX ORDER — CYCLOBENZAPRINE HCL 10 MG
10 TABLET ORAL NIGHTLY PRN
Qty: 30 TABLET | Refills: 0 | Status: SHIPPED | OUTPATIENT
Start: 2025-08-07

## 2025-08-25 ENCOUNTER — MED REC SCAN ONLY (OUTPATIENT)
Dept: PHYSICAL MEDICINE AND REHAB | Facility: CLINIC | Age: 40
End: 2025-08-25

## (undated) NOTE — LETTER
01/23/19        [address] [facility] [address] [id number]      Dear Ar Doll,    [facility] records indicate that you have outstanding lab work and or testing that was ordered for you and has not yet been completed:  Orders Placed This Encounter

## (undated) NOTE — LETTER
12/21/2017          To Whom It May Concern:    Cindy Vick is currently under my medical care and may not return to work at this time. Please excuse Shani Blum for 2 days 12/21/2017 & 12/22/2017. He may return to work on 12/23/2017.   Activity is restr

## (undated) NOTE — LETTER
2/25/2020          To Whom It May Concern:    Radhamyron Mildred is currently under my medical care and may not return to work at this time. Please excuse Boby Ceballos for 2 days. He may return to work on 2/27/2020.   He has been prescribed a medication to help